# Patient Record
Sex: MALE | Race: WHITE
[De-identification: names, ages, dates, MRNs, and addresses within clinical notes are randomized per-mention and may not be internally consistent; named-entity substitution may affect disease eponyms.]

---

## 2019-08-20 ENCOUNTER — HOSPITAL ENCOUNTER (EMERGENCY)
Dept: HOSPITAL 62 - ER | Age: 24
Discharge: HOME | End: 2019-08-20
Payer: COMMERCIAL

## 2019-08-20 VITALS — SYSTOLIC BLOOD PRESSURE: 131 MMHG | DIASTOLIC BLOOD PRESSURE: 75 MMHG

## 2019-08-20 DIAGNOSIS — R09.89: ICD-10-CM

## 2019-08-20 DIAGNOSIS — R11.2: ICD-10-CM

## 2019-08-20 DIAGNOSIS — J06.9: Primary | ICD-10-CM

## 2019-08-20 DIAGNOSIS — R09.81: ICD-10-CM

## 2019-08-20 DIAGNOSIS — Z87.891: ICD-10-CM

## 2019-08-20 DIAGNOSIS — R51: ICD-10-CM

## 2019-08-20 DIAGNOSIS — R05: ICD-10-CM

## 2019-08-20 PROCEDURE — S0119 ONDANSETRON 4 MG: HCPCS

## 2019-08-20 PROCEDURE — 99283 EMERGENCY DEPT VISIT LOW MDM: CPT

## 2019-08-20 PROCEDURE — 96372 THER/PROPH/DIAG INJ SC/IM: CPT

## 2019-08-20 NOTE — ER DOCUMENT REPORT
HPI





- HPI


Time Seen by Provider: 08/20/19 18:43


Pain Level: 4


Notes: 





Patient is a 23-year-old male who presents to the ED complaining of nasal 

congestion/discharge, dry nonproductive cough, HA's, irritated throat, feverish,

body ache 4 days.  He has had occ nausea and vomited once yesterday.  Able to 

tolerate PO today.  Patient states that he is eating and drinking without 

difficulties, but does have a decreased p.o. intake.  He is still urinating 

normally having normal bowel movements.  Patient has been using some 

over-the-counter meds for symptoms.  He denies any significant past medical 

history including cardiopulmonary history and immunocompromised conditions. HA 

is dull and not the worst of his life.  Did not reach maximal severity 

immediately.  Patient denies any smoking or IV drug use.  Patient requesting 

school note.  Denies any neck pain, chest pain, palpitations, syncope, shortness

of breath, wheeze, dyspnea, abdominal pain, diarrhea, urinary retention, 

dysuria, hematuria, or rash.





- ROS


Systems Reviewed and Negative: Yes All other systems reviewed and negative





Past Medical History





- Social History


Smoking Status: Former Smoker


Family History: Reviewed & Not Pertinent





Vertical Provider Document





- CONSTITUTIONAL


Agree With Documented VS: Yes


Notes: 





PHYSICAL EXAMINATION:





GENERAL: Well-appearing, well-nourished and in no acute distress.  A&Ox4.  

Answers questions appropriately.  Moves comfortably w/o notable distress





HEAD: Atraumatic, normocephalic.





EYES: Pupils equal round and reactive to light, extraocular movements intact, 

sclera anicteric, conjunctiva are normal.





ENT: EAC clear b/l.  TM's intact b/l without erythema, fluid, or perforation.  

Nares patent and with clear discharge.  oropharynx no erythema without exudates.

 No tonsilar hypertrophy without erythema or exudate.  No palatine shift.  Uvula

midline.  No tongue protrusion.  No drooling, hoarseness, or airway compromise. 

Moist mucous membranes.  No sinus tenderness.





NECK: Normal range of motion, supple without lymphadenopathy.  No rigid

ity/meningismus.





LUNGS: Breath sounds clear to auscultation bilaterally and equal.  No wheezes 

rales or rhonchi.  No retractions





HEART: Regular rate and rhythm without murmurs, rubs, gallops.





ABDOMEN: Soft, nontender, nondistended abdomen.  No guarding, no rebound. Normal

bowel sounds present.  No CVA tenderness bilaterally.  





NEUROLOGICAL: Normal speech, normal gait.  Cranial nerves grossly intact.





PSYCH: Normal mood, normal affect.





SKIN: Warm, Dry, normal turgor, no rashes or lesions noted.





- INFECTION CONTROL


TRAVEL OUTSIDE OF THE [place] IN LAST 30 DAYS: No





Course





- Re-evaluation


Re-evalutation: 





08/20/19 18:53


Patient is an afebrile, well-hydrated, 23-year-old male who presents to the ED 

with acute URI, suspect viral.  Vitals are acceptable.  PE is otherwise 

unremarkable.  No labs or imaging warranted at this time based on H&P.  Patient 

has no significant cardiopulmonary or immunocompromised medical conditions.  

Patient's lungs are clear to auscultation bilaterally without tachycardia, 

hypoxia, or tachypnea.  Patient is tolerating p.o. without any difficulties.  

Even though his symptoms do resemble influenza it is the off-season and pt is 

outside treatment window for tamiflu so we will hold off on any testing.  Pt 

given toradol and zofran.  Low suspicion for any meningitis, sepsis, 

peritonsillar/pharyngeal abscess, respiratory compromise, severe dehydration, or

other emergent systemic condition at this time.  Patient is aware this condition

can change from initial presentation and he needs to monitor symptoms closely.  

Conservative measures otherwise for symptoms.  Recheck with your PCM in 3-5 

days.  Return to the ED with any worsening/concerning symptoms otherwise as 

reviewed in discharge.  Patient is in agreement.





- Vital Signs


Vital signs: 


                                        











Temp Pulse Resp BP Pulse Ox


 


 99.8 F   89   16   131/75 H  98 


 


 08/20/19 18:44  08/20/19 18:44  08/20/19 18:44  08/20/19 18:44  08/20/19 18:44














Discharge





- Discharge


Clinical Impression: 


 Acute URI





Condition: Stable


Disposition: HOME, SELF-CARE


Instructions:  Upper Respiratory Illness ([facility])


Additional Instructions: 


Maintain adequate fluid intake


tylenol/ibuprofen as needed alternating every 3 hours for fever/body ache


over the counter cold medication as needed for symptoms


Humidified air may help


Wash your hands regularly


Wear a mask when coughing


F/u:  with your PCM in 2-3 days for a recheck





Return to the ED with any fever, altered mental status/behavior, chest pain, 

palpitations, syncope, headache, neck pain/stiffness, shortness of breath, chest

pains, wheezing, drooling, trouble swallowing/breathing, abdominal pain, n/v/d, 

rash, or worsening/concerning symptoms otherwise.


Prescriptions: 


Ondansetron [Zofran Odt 4 mg Tablet] 1 - 2 tab PO Q4H PRN #15 tab.rapdis


 PRN Reason: For Nausea/Vomiting


Forms:  Elevated Blood Pressure, Return to School


Referrals: 


[facility] [Provider Group] - Follow up as needed


[facility] [Provider Group] - Follow up as needed

## 2019-10-06 ENCOUNTER — HOSPITAL ENCOUNTER (EMERGENCY)
Dept: HOSPITAL 62 - ER | Age: 24
Discharge: HOME | End: 2019-10-06
Payer: COMMERCIAL

## 2019-10-06 VITALS — DIASTOLIC BLOOD PRESSURE: 75 MMHG | SYSTOLIC BLOOD PRESSURE: 134 MMHG

## 2019-10-06 DIAGNOSIS — F17.200: ICD-10-CM

## 2019-10-06 DIAGNOSIS — R03.0: Primary | ICD-10-CM

## 2019-10-06 DIAGNOSIS — R42: ICD-10-CM

## 2019-10-06 LAB
ADD MANUAL DIFF: NO
ALBUMIN SERPL-MCNC: 4.6 G/DL (ref 3.5–5)
ALP SERPL-CCNC: 48 U/L (ref 38–126)
ANION GAP SERPL CALC-SCNC: 7 MMOL/L (ref 5–19)
APPEARANCE UR: CLEAR
APTT PPP: (no result) S
AST SERPL-CCNC: 25 U/L (ref 17–59)
BARBITURATES UR QL SCN: NEGATIVE
BASOPHILS # BLD AUTO: 0 10^3/UL (ref 0–0.2)
BASOPHILS NFR BLD AUTO: 0.6 % (ref 0–2)
BILIRUB DIRECT SERPL-MCNC: 0 MG/DL (ref 0–0.4)
BILIRUB SERPL-MCNC: 0.4 MG/DL (ref 0.2–1.3)
BILIRUB UR QL STRIP: NEGATIVE
BUN SERPL-MCNC: 15 MG/DL (ref 7–20)
CALCIUM: 9.6 MG/DL (ref 8.4–10.2)
CHLORIDE SERPL-SCNC: 105 MMOL/L (ref 98–107)
CK MB SERPL-MCNC: 0.53 NG/ML (ref ?–4.55)
CK SERPL-CCNC: 46 U/L (ref 55–170)
CO2 SERPL-SCNC: 28 MMOL/L (ref 22–30)
EOSINOPHIL # BLD AUTO: 0.1 10^3/UL (ref 0–0.6)
EOSINOPHIL NFR BLD AUTO: 1.5 % (ref 0–6)
ERYTHROCYTE [DISTWIDTH] IN BLOOD BY AUTOMATED COUNT: 13.6 % (ref 11.5–14)
GLUCOSE SERPL-MCNC: 92 MG/DL (ref 75–110)
GLUCOSE UR STRIP-MCNC: NEGATIVE MG/DL
HCT VFR BLD CALC: 41.1 % (ref 37.9–51)
HGB BLD-MCNC: 13.9 G/DL (ref 13.5–17)
KETONES UR STRIP-MCNC: NEGATIVE MG/DL
LYMPHOCYTES # BLD AUTO: 2.1 10^3/UL (ref 0.5–4.7)
LYMPHOCYTES NFR BLD AUTO: 31.3 % (ref 13–45)
MCH RBC QN AUTO: 28.2 PG (ref 27–33.4)
MCHC RBC AUTO-ENTMCNC: 33.9 G/DL (ref 32–36)
MCV RBC AUTO: 83 FL (ref 80–97)
METHADONE UR QL SCN: NEGATIVE
MONOCYTES # BLD AUTO: 0.8 10^3/UL (ref 0.1–1.4)
MONOCYTES NFR BLD AUTO: 11.5 % (ref 3–13)
NEUTROPHILS # BLD AUTO: 3.7 10^3/UL (ref 1.7–8.2)
NEUTS SEG NFR BLD AUTO: 55.1 % (ref 42–78)
NITRITE UR QL STRIP: NEGATIVE
PCP UR QL SCN: NEGATIVE
PH UR STRIP: 6 [PH] (ref 5–9)
PLATELET # BLD: 336 10^3/UL (ref 150–450)
POTASSIUM SERPL-SCNC: 4.4 MMOL/L (ref 3.6–5)
PROT SERPL-MCNC: 7.6 G/DL (ref 6.3–8.2)
PROT UR STRIP-MCNC: NEGATIVE MG/DL
RBC # BLD AUTO: 4.94 10^6/UL (ref 4.35–5.55)
SP GR UR STRIP: 1.01
TOTAL CELLS COUNTED % (AUTO): 100 %
TROPONIN I SERPL-MCNC: < 0.012 NG/ML
URINE AMPHETAMINES SCREEN: NEGATIVE
URINE BENZODIAZEPINES SCREEN: NEGATIVE
URINE COCAINE SCREEN: NEGATIVE
URINE MARIJUANA (THC) SCREEN: NEGATIVE
UROBILINOGEN UR-MCNC: NEGATIVE MG/DL (ref ?–2)
WBC # BLD AUTO: 6.7 10^3/UL (ref 4–10.5)

## 2019-10-06 PROCEDURE — 80053 COMPREHEN METABOLIC PANEL: CPT

## 2019-10-06 PROCEDURE — 93005 ELECTROCARDIOGRAM TRACING: CPT

## 2019-10-06 PROCEDURE — 80307 DRUG TEST PRSMV CHEM ANLYZR: CPT

## 2019-10-06 PROCEDURE — 85025 COMPLETE CBC W/AUTO DIFF WBC: CPT

## 2019-10-06 PROCEDURE — 93010 ELECTROCARDIOGRAM REPORT: CPT

## 2019-10-06 PROCEDURE — 82962 GLUCOSE BLOOD TEST: CPT

## 2019-10-06 PROCEDURE — 83690 ASSAY OF LIPASE: CPT

## 2019-10-06 PROCEDURE — 82553 CREATINE MB FRACTION: CPT

## 2019-10-06 PROCEDURE — 84484 ASSAY OF TROPONIN QUANT: CPT

## 2019-10-06 PROCEDURE — 82550 ASSAY OF CK (CPK): CPT

## 2019-10-06 PROCEDURE — 36415 COLL VENOUS BLD VENIPUNCTURE: CPT

## 2019-10-06 PROCEDURE — 81001 URINALYSIS AUTO W/SCOPE: CPT

## 2019-10-06 PROCEDURE — 99284 EMERGENCY DEPT VISIT MOD MDM: CPT

## 2019-10-06 PROCEDURE — 84443 ASSAY THYROID STIM HORMONE: CPT

## 2019-10-06 NOTE — ER DOCUMENT REPORT
ED General





- General


Chief Complaint: High Blood Pressure


Stated Complaint: BLOOD PRESSURE PROBLEM


Time Seen by Provider: 10/06/19 13:53


Primary Care Provider: 


GIORGIO BRIGHT PA-C [Primary Care Provider] - Follow up as needed


Mode of Arrival: Ambulatory


Notes: 





23-year-old male with a past medical history presents to the emergency 

department with concern for elevated blood pressure and lightheadedness.  

Patient states that the symptoms started a few days ago and he has a "fuzzy" 

feeling in his head.  He states that he never believed and panic attacks or 

anxiety before but feels like having the symptoms.  He checked his blood 

pressure at home and it was 150/100 on the right arm and 120/70 on the left arm.

 On arrival here patient blood pressure was 128/65.  Patient denies any acute 

weakness, confusion, but he states that he is having difficulty finding words.  

Patient denies any diaphoresis, denies dyspnea on exertion, denies nausea or 

vomiting.  Patient admitted after further interview that his girlfriend 

committed suicide 3 weeks ago and feels that this is temporally related to that 

event.


TRAVEL OUTSIDE OF THE [place] IN LAST 30 DAYS: No





- Related Data


Allergies/Adverse Reactions: 


                                        





No Known Allergies Allergy (Unverified 08/20/19 18:38)


   











Past Medical History





- General


Information source: Patient





- Social History


Smoking Status: Current Every Day Smoker


Chew tobacco use (# tins/day): No


Frequency of alcohol use: Heavy


Drug Abuse: None


Family History: Reviewed & Not Pertinent


Patient has suicidal ideation: No


Patient has homicidal ideation: No


Renal/ Medical History: Denies: Hx Peritoneal Dialysis





Review of Systems





- Review of Systems


Constitutional: See HPI


EENT: No symptoms reported


Cardiovascular: See HPI


Respiratory: See HPI


Gastrointestinal: See HPI


Genitourinary: No symptoms reported


Male Genitourinary: No symptoms reported


Musculoskeletal: No symptoms reported


Skin: No symptoms reported


Hematologic/Lymphatic: No symptoms reported


Neurological/Psychological: See HPI





Physical Exam





- Vital signs


Vitals: 





                                        











Temp Pulse Resp BP Pulse Ox


 


 98.7 F   68   18   129/67 H  100 


 


 10/06/19 13:46  10/06/19 13:46  10/06/19 13:46  10/06/19 13:46  10/06/19 13:46














- Notes


Notes: 





PHYSICAL EXAMINATION:





Reviewed vital signs and charting by RN





GENERAL: Alert, interacts well. No acute distress.


HEAD: Normocephalic, atraumatic.


EYES: Pupils equal and round. Extraocular movements intact.


ENT: Oral mucosa moist, tongue midline. 


NECK: Full range of motion. Trachea midline.


LUNGS: Clear to auscultation bilaterally, no wheezes, rales, or rhonchi. No 

respiratory distress.


HEART: Regular rate and rhythm. No murmur


ABDOMEN: soft, non-tender.  No distention. Bowel sounds present


EXTREMITIES: Moves all 4 extremities spontaneously. No edema,  No cyanosis.


NEURO: A &O X 3,  normal speech, normal gailt, PERRL, EOMI, SILT, follows 

commands in all 4 extremities, no gross abnormalities of cranial nerves, no 

focal neuro deficits, no pronator drift, finger-to-nose testing normal, rapid 

alternating hand movements normal, heel-to-shin normal,  strength 5/5 

bilateral, 5/5 strength in both proximal and distal upper and lower extremities


PSYCH: Normal affect, normal mood.


SKIN: Warm, dry, normal turgor. No rashes or lesions noted.








Course





- Re-evaluation


Re-evalutation: 





10/06/19 16:11


Overall well-appearing in no acute distress.  I strongly suspect this is related

to anxiety due to his girlfriend suicide 3 weeks ago.  He has a negative 

troponin and normal TSH.  He has no risk factors.  Heart score 0.  I have very 

low suspicion for ACS, aortic dissection, PE, or any other concerning cardiac 

etiology.  I recommended that the patient follow-up with [facility] and 

discuss possible behavioral health services.  He is stable for discharge.





- Vital Signs


Vital signs: 





                                        











Temp Pulse Resp BP Pulse Ox


 


 98.7 F   64   18   122/67   100 


 


 10/06/19 13:46  10/06/19 15:29  10/06/19 13:46  10/06/19 15:29  10/06/19 13:46














- Laboratory


Result Diagrams: 


                                 10/06/19 14:06





                                 10/06/19 14:06


Laboratory results interpreted by me: 





                                        











  10/06/19





  14:06


 


Creatine Kinase  46 L














Discharge





- Discharge


Clinical Impression: 


 Elevated blood pressure reading





Condition: Good


Disposition: HOME, SELF-CARE


Additional Instructions: 


Please follow-up with your primary provider at [facility] tomorrow.  Your 

work-up here was overall very reassuring, you had a normal neuro exam, and your 

lab work was all normal.  I suspect it might be related to the traumatic 

incident that occurred 3 weeks ago.  If you develop severe shortness of breath, 

severe chest pain, you pass out, you become acutely confused and slurring her 

words, you have any limb weakness, or you have any other concerning symptoms 

please immediately return to the emergency department.


Referrals: 


GIORGIO BRIGHT PA-C [Primary Care Provider] - Follow up tomorrow

## 2019-10-06 NOTE — ER DOCUMENT REPORT
ED Medical Screen (RME)





- General


Chief Complaint: High Blood Pressure


Stated Complaint: BLOOD PRESSURE PROBLEM


Time Seen by Provider: 10/06/19 13:53


Mode of Arrival: Ambulatory


Information source: Patient


Notes: 





23-year-old male presented to ED for complaint of elevated blood pressure at 

home.  He states at home his blood pressure was 150/100.  He states he does feel

very lightheaded.  He states he does not have a history of elevated blood 

pressure but his father and grandfather have cardiac problems and someone had a 

stroke.  He states he became very concerned so he came to the emergency room to 

be evaluated.  His blood pressure is 129/65 at this time.  He states he smokes 

about a pack a day and sixpack of beer on the weekends.  No illicit drugs.  He 

states he has been having panic attacks recently but is not been diagnosed as 

yet.  He has been diagnosed with a testicular torsion but no surgery and a torn 

meniscus 20 with no surgery.  Patient states she is getting more more lig

htheaded and has short-term memory problems today

















I have greeted and performed a rapid initial assessment of this patient.  A 

comprehensive ED assessment and evaluation of the patient, analysis of test 

results and completion of medical decision making process will be conducted by 

an additional ED providers.


TRAVEL OUTSIDE OF THE [place] IN LAST 30 DAYS: No





- Related Data


Allergies/Adverse Reactions: 


                                        





No Known Allergies Allergy (Unverified 08/20/19 18:38)


   











Past Medical History


Renal/ Medical History: Denies: Hx Peritoneal Dialysis





Physical Exam





- Vital signs


Vitals: 


                                        











Temp Pulse Resp BP Pulse Ox


 


 98.7 F   68   18   129/67 H  100 


 


 10/06/19 13:46  10/06/19 13:46  10/06/19 13:46  10/06/19 13:46  10/06/19 13:46














Course





- Vital Signs


Vital signs: 


                                        











Temp Pulse Resp BP Pulse Ox


 


 98.7 F   68   18   129/67 H  100 


 


 10/06/19 13:46  10/06/19 13:46  10/06/19 13:46  10/06/19 13:46  10/06/19 13:46

## 2020-11-21 ENCOUNTER — HOSPITAL ENCOUNTER (EMERGENCY)
Dept: HOSPITAL 62 - ER | Age: 25
Discharge: HOME | End: 2020-11-21
Payer: COMMERCIAL

## 2020-11-21 VITALS — DIASTOLIC BLOOD PRESSURE: 73 MMHG | SYSTOLIC BLOOD PRESSURE: 129 MMHG

## 2020-11-21 DIAGNOSIS — S61.230A: Primary | ICD-10-CM
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
## 2020-11-21 PROCEDURE — 36415 COLL VENOUS BLD VENIPUNCTURE: CPT

## 2020-11-21 PROCEDURE — 80074 ACUTE HEPATITIS PANEL: CPT

## 2020-11-21 PROCEDURE — 99282 EMERGENCY DEPT VISIT SF MDM: CPT

## 2020-11-21 PROCEDURE — 86701 HIV-1ANTIBODY: CPT

## 2020-11-23 LAB — HEPATITIS C VIRUS ANTIBODY: <0.1 S/CO RATIO (ref 0–0.9)

## 2022-08-30 ENCOUNTER — APPOINTMENT (OUTPATIENT)
Dept: ORTHOPEDIC SURGERY | Facility: CLINIC | Age: 27
End: 2022-08-30

## 2022-08-30 VITALS — BODY MASS INDEX: 28.04 KG/M2 | HEIGHT: 68 IN | WEIGHT: 185 LBS

## 2022-08-30 PROCEDURE — 73562 X-RAY EXAM OF KNEE 3: CPT | Mod: RT

## 2022-08-30 PROCEDURE — 99203 OFFICE O/P NEW LOW 30 MIN: CPT

## 2022-08-30 NOTE — PHYSICAL EXAM
[NL (0)] : extension 0 degrees [5___] : hamstring 5[unfilled]/5 [] : no erythema [4___] : quadriceps 4[unfilled]/5 [Right] : right knee [There are no fractures, subluxations or dislocations. No significant abnormalities are seen] : There are no fractures, subluxations or dislocations. No significant abnormalities are seen [FreeTextEntry8] : ttp quad tendon.  no palpable defect.  [de-identified] : able to slr.  [TWNoteComboBox7] : flexion 130 degrees

## 2022-08-30 NOTE — DISCUSSION/SUMMARY
[de-identified] : Instructions:  Progress Note completed by Tracy Carrion PA-C\par * Dr. Blakely -- The documentation recorded accurately reflects the decisions made by me during this visit.

## 2022-08-30 NOTE — ASSESSMENT
[FreeTextEntry1] : acute onset of right knee pain after feeling a sharp pain after doing a kick flip end of june 2022.  ttp anterior and along quad tendon.  able to slr.   continued pain despite rest, hep.  concern for partial quad tear.  given young patient want to make sure no loss of quad integrity.  possible history of meniscus.

## 2022-08-30 NOTE — HISTORY OF PRESENT ILLNESS
[8] : 8 [0] : 0 [Dull/Aching] : dull/aching [Localized] : localized [Sharp] : sharp [Throbbing] : throbbing [Intermittent] : intermittent [Leisure] : leisure [Stairs] : stairs [de-identified] : 8/30/22:  acute onset of right knee pain after feeling a sharp pain after doing a kick flip end of june 2022.  [] : no [FreeTextEntry3] : 2 months ago [FreeTextEntry5] : New patient is here for right knee. Torn meniscus in knee when he was 16. 2 months ago Patient was skateboarding and twisted his knee and felt a sharp pain. Patient has been in pain since and has difficulty using stairs.

## 2022-09-09 ENCOUNTER — APPOINTMENT (OUTPATIENT)
Dept: ORTHOPEDIC SURGERY | Facility: CLINIC | Age: 27
End: 2022-09-09

## 2022-09-10 ENCOUNTER — FORM ENCOUNTER (OUTPATIENT)
Age: 27
End: 2022-09-10

## 2022-09-11 ENCOUNTER — APPOINTMENT (OUTPATIENT)
Dept: MRI IMAGING | Facility: CLINIC | Age: 27
End: 2022-09-11

## 2022-09-11 PROCEDURE — 73721 MRI JNT OF LWR EXTRE W/O DYE: CPT | Mod: RT

## 2022-09-16 ENCOUNTER — APPOINTMENT (OUTPATIENT)
Dept: ORTHOPEDIC SURGERY | Facility: CLINIC | Age: 27
End: 2022-09-16

## 2022-09-16 VITALS — WEIGHT: 185 LBS | BODY MASS INDEX: 28.04 KG/M2 | HEIGHT: 68 IN

## 2022-09-16 DIAGNOSIS — S86.911A STRAIN OF UNSPECIFIED MUSCLE(S) AND TENDON(S) AT LOWER LEG LEVEL, RIGHT LEG, INITIAL ENCOUNTER: ICD-10-CM

## 2022-09-16 PROCEDURE — 99214 OFFICE O/P EST MOD 30 MIN: CPT

## 2022-09-16 NOTE — PHYSICAL EXAM
[NL (0)] : extension 0 degrees [4___] : quadriceps 4[unfilled]/5 [5___] : hamstring 5[unfilled]/5 [Right] : right knee [There are no fractures, subluxations or dislocations. No significant abnormalities are seen] : There are no fractures, subluxations or dislocations. No significant abnormalities are seen [] : no erythema [FreeTextEntry8] : ttp quad tendon.  no palpable defect.  [de-identified] : able to slr.  [TWNoteComboBox7] : flexion 130 degrees

## 2022-09-16 NOTE — HISTORY OF PRESENT ILLNESS
[Dull/Aching] : dull/aching [8] : 8 [0] : 0 [Localized] : localized [Sharp] : sharp [Throbbing] : throbbing [Intermittent] : intermittent [Leisure] : leisure [Stairs] : stairs [de-identified] : 8/30/22:  acute onset of right knee pain after feeling a sharp pain after doing a kick flip end of june 2022.\par 9/16/22:  right knee improved but still some pain.  [] : no [FreeTextEntry3] : 2 months ago [FreeTextEntry5] : New patient is here for right knee. Torn meniscus in knee when he was 16. 2 months ago Patient was skateboarding and twisted his knee and felt a sharp pain. Patient has been in pain since and has difficulty using stairs.

## 2022-09-16 NOTE — ASSESSMENT
[FreeTextEntry1] : acute onset of right knee pain after feeling a sharp pain after doing a kick flip end of june 2022.  ttp anterior and along quad tendon.  mri 2022 shows quad inflammation and possible partial tear.  improved.

## 2022-10-14 ENCOUNTER — APPOINTMENT (OUTPATIENT)
Dept: ORTHOPEDIC SURGERY | Facility: CLINIC | Age: 27
End: 2022-10-14

## 2023-11-09 ENCOUNTER — EMERGENCY (EMERGENCY)
Facility: HOSPITAL | Age: 28
LOS: 1 days | Discharge: HOME | End: 2023-11-09
Attending: EMERGENCY MEDICINE
Payer: COMMERCIAL

## 2023-11-09 VITALS
RESPIRATION RATE: 18 BRPM | OXYGEN SATURATION: 98 % | HEART RATE: 69 BPM | WEIGHT: 160.06 LBS | TEMPERATURE: 99 F | DIASTOLIC BLOOD PRESSURE: 76 MMHG | SYSTOLIC BLOOD PRESSURE: 107 MMHG

## 2023-11-09 LAB
ALBUMIN SERPL ELPH-MCNC: 4.6 G/DL — SIGNIFICANT CHANGE UP (ref 3.3–5.2)
ALBUMIN SERPL ELPH-MCNC: 4.6 G/DL — SIGNIFICANT CHANGE UP (ref 3.3–5.2)
ALP SERPL-CCNC: 65 U/L — SIGNIFICANT CHANGE UP (ref 40–120)
ALP SERPL-CCNC: 65 U/L — SIGNIFICANT CHANGE UP (ref 40–120)
ALT FLD-CCNC: 19 U/L — SIGNIFICANT CHANGE UP
ALT FLD-CCNC: 19 U/L — SIGNIFICANT CHANGE UP
ANION GAP SERPL CALC-SCNC: 13 MMOL/L — SIGNIFICANT CHANGE UP (ref 5–17)
ANION GAP SERPL CALC-SCNC: 13 MMOL/L — SIGNIFICANT CHANGE UP (ref 5–17)
AST SERPL-CCNC: 21 U/L — SIGNIFICANT CHANGE UP
AST SERPL-CCNC: 21 U/L — SIGNIFICANT CHANGE UP
BILIRUB SERPL-MCNC: 0.4 MG/DL — SIGNIFICANT CHANGE UP (ref 0.4–2)
BILIRUB SERPL-MCNC: 0.4 MG/DL — SIGNIFICANT CHANGE UP (ref 0.4–2)
BUN SERPL-MCNC: 16.1 MG/DL — SIGNIFICANT CHANGE UP (ref 8–20)
BUN SERPL-MCNC: 16.1 MG/DL — SIGNIFICANT CHANGE UP (ref 8–20)
CALCIUM SERPL-MCNC: 9.6 MG/DL — SIGNIFICANT CHANGE UP (ref 8.4–10.5)
CALCIUM SERPL-MCNC: 9.6 MG/DL — SIGNIFICANT CHANGE UP (ref 8.4–10.5)
CHLORIDE SERPL-SCNC: 103 MMOL/L — SIGNIFICANT CHANGE UP (ref 96–108)
CHLORIDE SERPL-SCNC: 103 MMOL/L — SIGNIFICANT CHANGE UP (ref 96–108)
CO2 SERPL-SCNC: 26 MMOL/L — SIGNIFICANT CHANGE UP (ref 22–29)
CO2 SERPL-SCNC: 26 MMOL/L — SIGNIFICANT CHANGE UP (ref 22–29)
CREAT SERPL-MCNC: 1.17 MG/DL — SIGNIFICANT CHANGE UP (ref 0.5–1.3)
CREAT SERPL-MCNC: 1.17 MG/DL — SIGNIFICANT CHANGE UP (ref 0.5–1.3)
EGFR: 88 ML/MIN/1.73M2 — SIGNIFICANT CHANGE UP
EGFR: 88 ML/MIN/1.73M2 — SIGNIFICANT CHANGE UP
GLUCOSE SERPL-MCNC: 93 MG/DL — SIGNIFICANT CHANGE UP (ref 70–99)
GLUCOSE SERPL-MCNC: 93 MG/DL — SIGNIFICANT CHANGE UP (ref 70–99)
HCT VFR BLD CALC: 44.7 % — SIGNIFICANT CHANGE UP (ref 39–50)
HCT VFR BLD CALC: 44.7 % — SIGNIFICANT CHANGE UP (ref 39–50)
HGB BLD-MCNC: 15.3 G/DL — SIGNIFICANT CHANGE UP (ref 13–17)
HGB BLD-MCNC: 15.3 G/DL — SIGNIFICANT CHANGE UP (ref 13–17)
HIV 1 & 2 AB SERPL IA.RAPID: SIGNIFICANT CHANGE UP
HIV 1 & 2 AB SERPL IA.RAPID: SIGNIFICANT CHANGE UP
MCHC RBC-ENTMCNC: 27.6 PG — SIGNIFICANT CHANGE UP (ref 27–34)
MCHC RBC-ENTMCNC: 27.6 PG — SIGNIFICANT CHANGE UP (ref 27–34)
MCHC RBC-ENTMCNC: 34.2 GM/DL — SIGNIFICANT CHANGE UP (ref 32–36)
MCHC RBC-ENTMCNC: 34.2 GM/DL — SIGNIFICANT CHANGE UP (ref 32–36)
MCV RBC AUTO: 80.7 FL — SIGNIFICANT CHANGE UP (ref 80–100)
MCV RBC AUTO: 80.7 FL — SIGNIFICANT CHANGE UP (ref 80–100)
PLATELET # BLD AUTO: 420 K/UL — HIGH (ref 150–400)
PLATELET # BLD AUTO: 420 K/UL — HIGH (ref 150–400)
POTASSIUM SERPL-MCNC: 4.4 MMOL/L — SIGNIFICANT CHANGE UP (ref 3.5–5.3)
POTASSIUM SERPL-MCNC: 4.4 MMOL/L — SIGNIFICANT CHANGE UP (ref 3.5–5.3)
POTASSIUM SERPL-SCNC: 4.4 MMOL/L — SIGNIFICANT CHANGE UP (ref 3.5–5.3)
POTASSIUM SERPL-SCNC: 4.4 MMOL/L — SIGNIFICANT CHANGE UP (ref 3.5–5.3)
PROT SERPL-MCNC: 7.6 G/DL — SIGNIFICANT CHANGE UP (ref 6.6–8.7)
PROT SERPL-MCNC: 7.6 G/DL — SIGNIFICANT CHANGE UP (ref 6.6–8.7)
RBC # BLD: 5.54 M/UL — SIGNIFICANT CHANGE UP (ref 4.2–5.8)
RBC # BLD: 5.54 M/UL — SIGNIFICANT CHANGE UP (ref 4.2–5.8)
RBC # FLD: 13 % — SIGNIFICANT CHANGE UP (ref 10.3–14.5)
RBC # FLD: 13 % — SIGNIFICANT CHANGE UP (ref 10.3–14.5)
SODIUM SERPL-SCNC: 142 MMOL/L — SIGNIFICANT CHANGE UP (ref 135–145)
SODIUM SERPL-SCNC: 142 MMOL/L — SIGNIFICANT CHANGE UP (ref 135–145)
WBC # BLD: 8.78 K/UL — SIGNIFICANT CHANGE UP (ref 3.8–10.5)
WBC # BLD: 8.78 K/UL — SIGNIFICANT CHANGE UP (ref 3.8–10.5)
WBC # FLD AUTO: 8.78 K/UL — SIGNIFICANT CHANGE UP (ref 3.8–10.5)
WBC # FLD AUTO: 8.78 K/UL — SIGNIFICANT CHANGE UP (ref 3.8–10.5)

## 2023-11-09 PROCEDURE — 36415 COLL VENOUS BLD VENIPUNCTURE: CPT

## 2023-11-09 PROCEDURE — 86703 HIV-1/HIV-2 1 RESULT ANTBDY: CPT

## 2023-11-09 PROCEDURE — 80074 ACUTE HEPATITIS PANEL: CPT

## 2023-11-09 PROCEDURE — 99284 EMERGENCY DEPT VISIT MOD MDM: CPT

## 2023-11-09 PROCEDURE — 80053 COMPREHEN METABOLIC PANEL: CPT

## 2023-11-09 PROCEDURE — 85027 COMPLETE CBC AUTOMATED: CPT

## 2023-11-09 PROCEDURE — 99283 EMERGENCY DEPT VISIT LOW MDM: CPT

## 2023-11-09 NOTE — ED PROVIDER NOTE - CLINICAL SUMMARY MEDICAL DECISION MAKING FREE TEXT BOX
Patient ordered for baseline labs, will follow-up with PMD for follow-up.  Labs ordered from source patient.  Discussed risks and benefits of postexposure prophylaxis, after shared decision-making patient opted for not taking postexposure prophylaxis at this point given low risk of transmission.

## 2023-11-09 NOTE — ED PROVIDER NOTE - NSFOLLOWUPINSTRUCTIONS_ED_ALL_ED_FT
Follow-up with primary doctor/infectious disease doctor, see information above.  Return to ED for any new or worsening symptoms.    Body Fluid Exposure Information  People come in contact with blood and other body fluids in many ways. Sometimes, body fluids may have germs (bacteria or viruses) that can cause infections. These germs can be spread when an infected person's body fluids come in contact with the mouth, nose, eyes, genitals, or broken skin of another person. Broken skin includes chapped skin, cuts, abrasions, or irritation and swelling of the skin (dermatitis).    You are more likely to be exposed to infected body fluids if you:  Are a health care worker or family member who is taking care of a sick person.  Use needles to inject drugs, and you share needles with other users.  Have sex or engage in other sexual activities without using a condom or other protection.  The risk of an infection spreading through exposure to body fluids is small and depends on several factors. These include:  The type of body fluid.  How you were exposed to the body fluid.  The type of infection.  The risk factors of the person who is the source of the body fluid. Your health care provider can help you assess the risk.  What types of body fluids can spread infection?  The following body fluids can spread infections:  Blood.  Semen.  Vaginal secretions.  Urine.  Feces.  Saliva.  Nasal or eye discharge.  Breast milk.  Amniotic fluid.  Fluids surrounding body organs.  Pus or other fluids coming from a wound.  What are some first-aid measures for body fluid exposure?  The following steps should be taken as soon as possible after you are exposed to body fluids:    Intact skin    For contact with closed skin, wash the area with soap and water. If soap and water are not available, use hand .  Broken skin    For contact with broken skin:  Let the area bleed a little.  Wash the area well with soap and water. If soap is not available, use just water or hand .  Place a bandage or clean towel on the wound and apply gentle pressure to stop the bleeding. Do not squeeze or rub the area.  Do not use harsh chemicals such as bleach or iodine.    Eyes    Rinse your eyes with water or saline solution for 30 seconds or longer.  If you are wearing contact lenses, leave the contact lenses in while rinsing your eyes. After the rinsing is complete, remove the contact lenses.  Mouth    Spit out the fluids. Rinse with water 4–5 times, spitting it out each time.  When should I seek help?  After performing first aid:  Call your health care provider or seek emergency care right away if blood or other body fluids made contact with broken skin or the eyes, nose, mouth, or genitals.  Tell your work supervisor immediately if the exposure to body fluid happened in the workplace. Follow your company's procedures for dealing with body fluid exposure.  What will happen after I report the exposure?  Your health care provider will ask you several questions, including questions about:  Your medical history, including vaccine records.  Date and time of the exposure.  Whether you saw body fluids during the exposure.  Type of body fluid you were exposed to.  Volume of body fluid you were exposed to.  How the exposure happened.  Any devices used, such as needles.  The area of your body that made contact with the body fluid.  Any injury to your skin or other areas.  How long contact was made with the body fluid.  Whether the person whose body fluid you were exposed to has certain risk factors or health conditions, if known.  Your health care provider will assess your risk for infection. Often, no treatment is necessary. However, in some cases:  Your health care provider may recommend doing blood tests right away.  Follow-up blood tests may also be done at certain times during the upcoming weeks and months to check for any changes.  You may be offered treatment to prevent infection after exposure (post-exposure prophylaxis). This may include certain vaccines or medicines. This is necessary when there is a risk of a serious infection, such as HIV (human immunodeficiency virus) or hepatitis B. Your health care provider will discuss the right treatment and vaccines with you.  How can I prevent infection?  To reduce your chances of getting an infection    Make sure your vaccines are up to date, including vaccines for tetanus and hepatitis.  Learn and follow any guidelines for preventing exposure (universal precautions) that are provided at your workplace.  Keep all follow-up visits as told by your health care provider. This is important. You will need to be monitored after you are evaluated for exposure to body fluids.  To avoid spreading infection to others      Wash your hands frequently with soap and water. If soap and water are not available, use hand .  Do not share toothbrushes, razors, dental floss, or other personal items.  Keep open wounds covered.  Dispose of any items with blood on them by putting them in the trash. This includes razors, tampons, and bandages.  Do not have sex or engage in sexual activities until you know you are free of infection.  Do not donate blood, plasma, breast milk, sperm, or other body fluids.  Do not share drug supplies with others. These include needles, syringes, straws, and pipes.  Follow all instructions from your health care provider for preventing the spread of infection.  Summary  Contact with blood and other body fluids can happen in many ways. Sometimes, body fluids may have germs that can cause an infection.  Treatment depends on the type of body fluid you were exposed to and what part of your body was exposed.  Call your health care provider or seek emergency care right away if blood or other body fluids made contact with broken skin or the eyes, nose, mouth, or genitals.  Make sure all your vaccines are up to date, including vaccines for tetanus and hepatitis.

## 2023-11-09 NOTE — ED PROVIDER NOTE - PATIENT PORTAL LINK FT
You can access the FollowMyHealth Patient Portal offered by [facility] by registering at the following website: [email or web address]. By joining [employer]’s FollowMyHealth portal, you will also be able to view your health information using other applications (apps) compatible with our system.

## 2023-11-09 NOTE — ED PROVIDER NOTE - NSICDXPASTMEDICALHX_GEN_ALL_CORE_FT
Patient requesting call back regarding medications prescribed by another provider and asking if Dr. Goncalves can prescribe them.    Ray [phone]       PAST MEDICAL HISTORY:  No pertinent past medical history

## 2023-11-09 NOTE — ED PROVIDER NOTE - PHYSICAL EXAMINATION
Gen: Well appearing in NAD  Head: NC/AT  Neck: trachea midline  Resp:  No distress  Ext: no deformities  Neuro:  A&O appears non focal  Skin:   1 cm small linear right palmar abrasion nonbleeding  Psych:  Normal affect and mood

## 2023-11-09 NOTE — ED PROVIDER NOTE - CARE PROVIDER_API CALL
Kirill Reynolds  Infectious Disease  [address] [place] [address], [place] [address]  Phone: [phone]  Fax: [phone]  Follow Up Time: 4-6 Days

## 2023-11-09 NOTE — ED PROVIDER NOTE - OBJECTIVE STATEMENT
27-year-old male brought in status postexposure.  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  No obvious additional exposures.

## 2023-11-10 LAB
HAV IGM SER-ACNC: SIGNIFICANT CHANGE UP
HAV IGM SER-ACNC: SIGNIFICANT CHANGE UP
HBV CORE IGM SER-ACNC: SIGNIFICANT CHANGE UP
HBV CORE IGM SER-ACNC: SIGNIFICANT CHANGE UP
HBV SURFACE AG SER-ACNC: SIGNIFICANT CHANGE UP
HBV SURFACE AG SER-ACNC: SIGNIFICANT CHANGE UP
HCV AB S/CO SERPL IA: 0.08 S/CO — SIGNIFICANT CHANGE UP (ref 0–0.99)
HCV AB S/CO SERPL IA: 0.08 S/CO — SIGNIFICANT CHANGE UP (ref 0–0.99)
HCV AB SERPL-IMP: SIGNIFICANT CHANGE UP
HCV AB SERPL-IMP: SIGNIFICANT CHANGE UP

## 2024-02-09 ENCOUNTER — EMERGENCY (EMERGENCY)
Facility: HOSPITAL | Age: 29
LOS: 1 days | Discharge: HOME | End: 2024-02-09
Attending: EMERGENCY MEDICINE
Payer: COMMERCIAL

## 2024-02-09 VITALS
SYSTOLIC BLOOD PRESSURE: 112 MMHG | TEMPERATURE: 98 F | WEIGHT: 154.98 LBS | HEIGHT: 66 IN | HEART RATE: 86 BPM | OXYGEN SATURATION: 97 % | DIASTOLIC BLOOD PRESSURE: 74 MMHG | RESPIRATION RATE: 18 BRPM

## 2024-02-09 PROCEDURE — 99283 EMERGENCY DEPT VISIT LOW MDM: CPT

## 2024-02-09 PROCEDURE — 99282 EMERGENCY DEPT VISIT SF MDM: CPT

## 2024-02-09 NOTE — ED PROVIDER NOTE - PATIENT PORTAL LINK FT
You can access the FollowMyHealth Patient Portal offered by [facility] by registering at the following website: [email or web address]. By joining [employer]’s FollowMyHealth portal, you will also be able to view your health information using other applications (apps) compatible with our system.
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
## 2025-08-07 ENCOUNTER — NON-APPOINTMENT (OUTPATIENT)
Age: 30
End: 2025-08-07